# Patient Record
Sex: FEMALE | Race: WHITE | URBAN - NONMETROPOLITAN AREA
[De-identification: names, ages, dates, MRNs, and addresses within clinical notes are randomized per-mention and may not be internally consistent; named-entity substitution may affect disease eponyms.]

---

## 2017-04-17 ENCOUNTER — COMMUNICATION - GICH (OUTPATIENT)
Dept: GERIATRICS | Facility: OTHER | Age: 82
End: 2017-04-17

## 2017-04-17 DIAGNOSIS — N39.41 URGE INCONTINENCE: ICD-10-CM

## 2018-01-04 NOTE — TELEPHONE ENCOUNTER
Patient Information     Patient Name MRN Erica Dominguez [id number] Female 1932      Telephone Encounter by Eliza Guerra RN at 2017  4:47 PM     Author:  Eliza Guerra RN Service:  (none) Author Type:  (none)     Filed:  2017  4:48 PM Encounter Date:  2017 Status:  Signed     :  Eliza Guerra RN (NURS- Registered Nurse)            Office visit in the past 12 months or per provider note.    Last visit with URSULA BRANNON was on: 2015 in [facility]  Next visit with URSULA BRANNON is on: No future appointment listed with this provider  Next visit with Nursing Home is on: No future appointment listed in this department    Max refill for 12 months from last office visit or per provider note.    Medication refused - patient no longer sees Ursula Brannon NP for primary care.    Unable to complete prescription refill per RN Medication Refill Policy.................... Eliza Guerra ....................  2017   4:48 PM

## 2018-01-19 PROBLEM — K21.9 ESOPHAGEAL REFLUX: Status: ACTIVE | Noted: 2018-01-19

## 2018-01-19 PROBLEM — E03.9 HYPOTHYROIDISM: Status: ACTIVE | Noted: 2018-01-19

## 2018-01-19 PROBLEM — I10 HYPERTENSION: Status: ACTIVE | Noted: 2018-01-19

## 2018-01-19 PROBLEM — M79.609 PAIN IN LIMB: Status: ACTIVE | Noted: 2018-01-19

## 2018-01-19 PROBLEM — C50.919 BREAST CANCER, FEMALE (H): Status: ACTIVE | Noted: 2018-01-19

## 2018-01-19 RX ORDER — SIMVASTATIN 40 MG
40 TABLET ORAL
COMMUNITY
Start: 2015-09-21

## 2018-01-19 RX ORDER — HYDROCHLOROTHIAZIDE 25 MG/1
25 TABLET ORAL
COMMUNITY
Start: 2015-09-21

## 2018-01-19 RX ORDER — TRAMADOL HYDROCHLORIDE 50 MG/1
TABLET ORAL
COMMUNITY
Start: 2013-10-01

## 2018-01-19 RX ORDER — LEVOTHYROXINE SODIUM 150 UG/1
150 TABLET ORAL
COMMUNITY
Start: 2015-09-21

## 2018-01-19 RX ORDER — DIPHENOXYLATE HYDROCHLORIDE AND ATROPINE SULFATE 2.5; .025 MG/1; MG/1
1 TABLET ORAL
COMMUNITY
Start: 2013-05-01

## 2018-01-19 RX ORDER — OXYBUTYNIN CHLORIDE 5 MG/1
5 TABLET ORAL
COMMUNITY
Start: 2015-09-21

## 2018-02-12 ENCOUNTER — DOCUMENTATION ONLY (OUTPATIENT)
Dept: FAMILY MEDICINE | Facility: OTHER | Age: 83
End: 2018-02-12

## 2018-06-12 ENCOUNTER — TRANSFERRED RECORDS (OUTPATIENT)
Dept: HEALTH INFORMATION MANAGEMENT | Facility: OTHER | Age: 83
End: 2018-06-12

## 2018-06-19 ENCOUNTER — TRANSFERRED RECORDS (OUTPATIENT)
Dept: HEALTH INFORMATION MANAGEMENT | Facility: OTHER | Age: 83
End: 2018-06-19

## 2018-06-21 ENCOUNTER — TRANSFERRED RECORDS (OUTPATIENT)
Dept: HEALTH INFORMATION MANAGEMENT | Facility: OTHER | Age: 83
End: 2018-06-21

## 2018-06-22 ENCOUNTER — TRANSFERRED RECORDS (OUTPATIENT)
Dept: HEALTH INFORMATION MANAGEMENT | Facility: OTHER | Age: 83
End: 2018-06-22

## 2018-06-27 ENCOUNTER — TRANSFERRED RECORDS (OUTPATIENT)
Dept: HEALTH INFORMATION MANAGEMENT | Facility: OTHER | Age: 83
End: 2018-06-27

## 2018-06-27 ENCOUNTER — MEDICAL CORRESPONDENCE (OUTPATIENT)
Dept: HEALTH INFORMATION MANAGEMENT | Facility: OTHER | Age: 83
End: 2018-06-27

## 2018-06-27 ENCOUNTER — HOSPITAL ENCOUNTER (OUTPATIENT)
Dept: ULTRASOUND IMAGING | Facility: OTHER | Age: 83
Discharge: HOME | End: 2018-06-27
Attending: FAMILY MEDICINE | Admitting: FAMILY MEDICINE
Payer: MEDICARE

## 2018-06-27 VITALS
TEMPERATURE: 97.4 F | SYSTOLIC BLOOD PRESSURE: 115 MMHG | RESPIRATION RATE: 16 BRPM | HEART RATE: 71 BPM | OXYGEN SATURATION: 98 % | DIASTOLIC BLOOD PRESSURE: 90 MMHG

## 2018-06-27 DIAGNOSIS — C79.9 METASTATIC CANCER (H): ICD-10-CM

## 2018-06-27 DIAGNOSIS — R59.9 ENLARGED LYMPH NODE: ICD-10-CM

## 2018-06-27 PROCEDURE — 10022 US BIOPSY FINE NEEDLE ASPIRATION LYMPH NODE: CPT

## 2018-06-27 PROCEDURE — 25000125 ZZHC RX 250: Performed by: RADIOLOGY

## 2018-06-27 PROCEDURE — 88341 IMHCHEM/IMCYTCHM EA ADD ANTB: CPT | Mod: XU

## 2018-06-27 PROCEDURE — 88360 TUMOR IMMUNOHISTOCHEM/MANUAL: CPT

## 2018-06-27 PROCEDURE — 88342 IMHCHEM/IMCYTCHM 1ST ANTB: CPT | Mod: XU

## 2018-06-27 PROCEDURE — 88173 CYTOPATH EVAL FNA REPORT: CPT

## 2018-06-27 PROCEDURE — 88305 TISSUE EXAM BY PATHOLOGIST: CPT

## 2018-06-27 PROCEDURE — 81210 BRAF GENE: CPT | Mod: GZ

## 2018-06-27 PROCEDURE — 81235 EGFR GENE COM VARIANTS: CPT

## 2018-06-27 RX ADMIN — LIDOCAINE HYDROCHLORIDE 5 ML: 10 INJECTION, SOLUTION INFILTRATION; PERINEURAL at 11:47

## 2018-06-27 NOTE — IP AVS SNAPSHOT
MRN:[id number]                      After Visit Summary   6/27/2018    Erica Rain    MRN: [id number]           Visit Information        Provider Department      6/27/2018 11:15 AM [facility];  IMAGING NURSE; [facility]           Review of your medicines      UNREVIEWED medicines. Ask your doctor about these medicines        Dose / Directions    aspirin 81 MG tablet        Dose:  81 mg   Take 81 mg by mouth Take 1 tablet by mouth once daily with a meal.   Refills:  0       hydrochlorothiazide 25 MG tablet   Commonly known as:  HYDRODIURIL        Dose:  25 mg   Take 25 mg by mouth Take 1 tablet by mouth once daily.   Refills:  0       levothyroxine 150 MCG tablet   Commonly known as:  SYNTHROID/LEVOTHROID        Dose:  150 mcg   Take 150 mcg by mouth Take 1 tablet by mouth before breakfast.   Refills:  0       MULTI-VITAMINS Tabs        Dose:  1 tablet   Take 1 tablet by mouth Take 1 tablet by mouth once daily.   Refills:  0       omeprazole 20 MG CR capsule   Commonly known as:  priLOSEC        Dose:  20 mg   Take 20 mg by mouth Take 1 capsule by mouth once daily before a meal.   Refills:  0       oxybutynin 5 MG tablet   Commonly known as:  DITROPAN        Dose:  5 mg   Take 5 mg by mouth Take 1 tablet by mouth 2 times daily.   Refills:  0       simvastatin 40 MG tablet   Commonly known as:  ZOCOR        Dose:  40 mg   Take 40 mg by mouth Take 1 tablet by mouth at bedtime.   Refills:  0       traMADol 50 MG tablet   Commonly known as:  ULTRAM        Use 1 tablet up to two times daily prn for arthritic pain.   Refills:  0                Protect others around you: Learn how to safely use, store and throw away your medicines at www.disposemymeds.org.         Follow-ups after your visit         Care Instructions        Further instructions from your care team       ULTRASOUND GUIDED NEEDLE ASPIRATION    Ultrasound guided needle aspiration is a procedure which involves  the insertion of a small needle to withdraw amount of tissue that will be examined by a pathologist.  Your doctor will notify you of the results of your procedure, usually within a few days.  Because this procedure requires the insertion of a needle into your tissue, there is a small risk of bleeding, local tenderness and rarely infection.    ACTIVITY:  Most patients can return to work the day of the procedure, however, avoid any vigorous physical activity for 24 hours.    COMFORT:  If you experience discomfort or tenderness at the site, you may take your usual pain medication, prescription or over the counter.    DIET:  You may resume your normal diet.    CARE OF PROCEDURE SITE:  Occasionally, a patient may have a small amount of bleeding from the needle puncture site.  If this happens, you should lie down and apply gentle direct pressure to the bleeding site for about 10 minutes.  When the bleeding has stopped, apply another clean band-aid.    Keep the bandage on for 24 hours.  Then you may remove the bandage and shower.  You may put on a clean band-aid on or leave it open to air.  For questions, problems or concerns, contact the Radiology Department at [phone]     Additional Information About Your Visit        Care EveryWhere ID     This is your Care EveryWhere ID. This could be used by other organizations to access your [facility] medical records  [id number]        Your Vitals Were     Blood Pressure Pulse Temperature Respirations Pulse Oximetry       115/90 71 97.4  F (36.3  C) 16 98%        Primary Care Provider Office Phone # Fax #    Hermelinda HURTADO YOMI Brannon [phone]      Equal Access to Services     [facility] : Hadii edmundo [place], waaxda lujohnadaha, qaybta kaalmada carey, reinaldo fischer. So [facility] [phone].    ATENCIÓN: Si habla español, tiene a gomez disposición servicios gratuitos de asistencia lingüística. Llame al [phone].    We comply  with applicable federal civil rights laws and [place] laws. We do not discriminate on the basis of race, color, national origin, age, disability, sex, sexual orientation, or gender identity.            Thank you!     Thank you for choosing [facility] for your care. Our goal is always to provide you with excellent care. Hearing back from our patients is one way we can continue to improve our services. Please take a few minutes to complete the written survey that you may receive in the mail after you visit with us. Thank you!             Medication List: This is a list of all your medications and when to take them. Check marks below indicate your daily home schedule. Keep this list as a reference.      Medications           Morning Afternoon Evening Bedtime As Needed    aspirin 81 MG tablet   Take 81 mg by mouth Take 1 tablet by mouth once daily with a meal.                                hydrochlorothiazide 25 MG tablet   Commonly known as:  HYDRODIURIL   Take 25 mg by mouth Take 1 tablet by mouth once daily.                                levothyroxine 150 MCG tablet   Commonly known as:  SYNTHROID/LEVOTHROID   Take 150 mcg by mouth Take 1 tablet by mouth before breakfast.                                MULTI-VITAMINS Tabs   Take 1 tablet by mouth Take 1 tablet by mouth once daily.                                omeprazole 20 MG CR capsule   Commonly known as:  priLOSEC   Take 20 mg by mouth Take 1 capsule by mouth once daily before a meal.                                oxybutynin 5 MG tablet   Commonly known as:  DITROPAN   Take 5 mg by mouth Take 1 tablet by mouth 2 times daily.                                simvastatin 40 MG tablet   Commonly known as:  ZOCOR   Take 40 mg by mouth Take 1 tablet by mouth at bedtime.                                traMADol 50 MG tablet   Commonly known as:  ULTRAM   Use 1 tablet up to two times daily prn for arthritic pain.

## 2018-06-27 NOTE — PROGRESS NOTES
Biopsy of L axillary lymph node done with cytology staff present.  Pt serenity procedure well. Pressure held on biopsy site for 5 minutes. Sterile 2x2 placed over insertion site and covered with a sterile tegaderm.

## 2018-06-27 NOTE — IP AVS SNAPSHOT
[facility]    [address]    [place] [address]    Phone:  [phone]    Fax:  [phone]                                       After Visit Summary   6/27/2018    Erica Rain    MRN: [id number]           After Visit Summary Signature Page     I have received my discharge instructions, and my questions have been answered. I have discussed any challenges I see with this plan with the nurse or doctor.    ..........................................................................................................................................  Patient/Patient Representative Signature      ..........................................................................................................................................  Patient Representative Print Name and Relationship to Patient    ..................................................               ................................................  Date                                            Time    ..........................................................................................................................................  Reviewed by Signature/Title    ...................................................              ..............................................  Date                                                            Time

## 2018-06-27 NOTE — DISCHARGE INSTRUCTIONS
ULTRASOUND GUIDED NEEDLE ASPIRATION    Ultrasound guided needle aspiration is a procedure which involves the insertion of a small needle to withdraw amount of tissue that will be examined by a pathologist.  Your doctor will notify you of the results of your procedure, usually within a few days.  Because this procedure requires the insertion of a needle into your tissue, there is a small risk of bleeding, local tenderness and rarely infection.    ACTIVITY:  Most patients can return to work the day of the procedure, however, avoid any vigorous physical activity for 24 hours.    COMFORT:  If you experience discomfort or tenderness at the site, you may take your usual pain medication, prescription or over the counter.    DIET:  You may resume your normal diet.    CARE OF PROCEDURE SITE:  Occasionally, a patient may have a small amount of bleeding from the needle puncture site.  If this happens, you should lie down and apply gentle direct pressure to the bleeding site for about 10 minutes.  When the bleeding has stopped, apply another clean band-aid.    Keep the bandage on for 24 hours.  Then you may remove the bandage and shower.  You may put on a clean band-aid on or leave it open to air.  For questions, problems or concerns, contact the Radiology Department at [phone]

## 2018-06-29 ENCOUNTER — ONCOLOGY VISIT (OUTPATIENT)
Dept: RADIATION ONCOLOGY | Facility: HOSPITAL | Age: 83
End: 2018-06-29
Attending: RADIOLOGY
Payer: MEDICARE

## 2018-06-29 VITALS
RESPIRATION RATE: 16 BRPM | WEIGHT: 187 LBS | SYSTOLIC BLOOD PRESSURE: 116 MMHG | BODY MASS INDEX: 33.13 KG/M2 | HEART RATE: 72 BPM | HEIGHT: 63 IN | DIASTOLIC BLOOD PRESSURE: 64 MMHG

## 2018-06-29 DIAGNOSIS — C79.31 BRAIN METASTASIS: Primary | ICD-10-CM

## 2018-06-29 PROCEDURE — G0463 HOSPITAL OUTPT CLINIC VISIT: HCPCS | Performed by: RADIOLOGY

## 2018-06-29 RX ORDER — METOPROLOL TARTRATE 25 MG/1
25 TABLET, FILM COATED ORAL 2 TIMES DAILY
COMMUNITY
Start: 2018-06-23

## 2018-06-29 RX ORDER — AMLODIPINE BESYLATE 2.5 MG/1
2.5 TABLET ORAL DAILY
Refills: 3 | COMMUNITY
Start: 2018-05-30

## 2018-06-29 RX ORDER — DEXAMETHASONE 4 MG/1
4 TABLET ORAL 2 TIMES DAILY
COMMUNITY
Start: 2018-06-23

## 2018-06-29 ASSESSMENT — PAIN SCALES - GENERAL: PAINLEVEL: MODERATE PAIN (5)

## 2018-06-29 NOTE — MR AVS SNAPSHOT
"              After Visit Summary   6/29/2018    Erica Rain    MRN: [id number]           Patient Information     Date Of Birth          4/28/1932        Visit Information        Provider Department      6/29/2018 9:00 AM Abraham Acevedo MD [facility]        Today's Diagnoses     Brain metastasis (H)    -  1       Follow-ups after your visit        Your next 10 appointments already scheduled     Jul 27, 2018 10:00 AM CDT   New Visit with David Ramos MD   [facility] ([facility])    [address]  [place] [id number] [phone]              Who to contact     If you have questions or need follow up information about today's clinic visit or your schedule please contact [facility] directly at [phone].  Normal or non-critical lab and imaging results will be communicated to you by MyChart, letter or phone within 4 business days after the clinic has received the results. If you do not hear from us within 7 days, please contact the clinic through MyChart or phone. If you have a critical or abnormal lab result, we will notify you by phone as soon as possible.  Submit refill requests through [employer] or call your pharmacy and they will forward the refill request to us. Please allow 3 business days for your refill to be completed.          Additional Information About Your Visit        Care EveryWhere ID     This is your Care EveryWhere ID. This could be used by other organizations to access your [place] medical records  [id number]        Your Vitals Were     Pulse Respirations Height Breastfeeding? BMI (Body Mass Index)       72 16 1.6 m (5' 3\") No 33.13 kg/m2        Blood Pressure from Last 3 Encounters:   06/29/18 116/64   06/27/18 115/90   09/21/15 112/82    Weight from Last 3 Encounters:   06/29/18 84.8 kg (187 lb)   09/21/15 89.5 kg (197 lb 4 oz)   09/04/14 90.8 kg (200 lb 4 oz)              We Performed the Following "     Full Code        Primary Care Provider Office Phone # Fax #    Hermelinda Brannon, APRN [phone] [address]  [place] [address]        Equal Access to Services     YOLA JOHNSON : Hadii edmundo ku hadfransiscoo Soomaali, waaxda luqadaha, qaybta kaalmada adeegyada, waxmak lopesn dafne chávez ava fischer. So [facility] [phone].    ATENCIÓN: Si habla español, tiene a gomez disposición servicios gratuitos de asistencia lingüística. Llame al [phone].    We comply with applicable federal civil rights laws and [place] laws. We do not discriminate on the basis of race, color, national origin, age, disability, sex, sexual orientation, or gender identity.            Thank you!     Thank you for choosing [facility]  for your care. Our goal is always to provide you with excellent care. Hearing back from our patients is one way we can continue to improve our services. Please take a few minutes to complete the written survey that you may receive in the mail after your visit with us. Thank you!             Your Updated Medication List - Protect others around you: Learn how to safely use, store and throw away your medicines at www.disposemymeds.org.          This list is accurate as of 6/29/18  1:39 PM.  Always use your most recent med list.                   Brand Name Dispense Instructions for use Diagnosis    amLODIPine 2.5 MG tablet    NORVASC     Take 2.5 mg by mouth daily        amoxicillin-clavulanate 875-125 MG per tablet    AUGMENTIN     Take 1 tablet by mouth 2 times daily        aspirin 81 MG tablet      Take 81 mg by mouth Take 1 tablet by mouth once daily with a meal.        dexamethasone 4 MG tablet    DECADRON     Take 4 mg by mouth 2 times daily        hydrochlorothiazide 25 MG tablet    HYDRODIURIL     Take 25 mg by mouth Take 1 tablet by mouth once daily.        levothyroxine 150 MCG tablet    SYNTHROID/LEVOTHROID     Take 150 mcg by mouth Take 1 tablet by mouth before  breakfast.        metoprolol tartrate 25 MG tablet    LOPRESSOR     Take 25 mg by mouth 2 times daily        MULTI-VITAMINS Tabs      Take 1 tablet by mouth Take 1 tablet by mouth once daily.        omeprazole 20 MG CR capsule    priLOSEC     Take 20 mg by mouth Take 1 capsule by mouth once daily before a meal.        oxybutynin 5 MG tablet    DITROPAN     Take 5 mg by mouth Take 1 tablet by mouth 2 times daily.        simvastatin 40 MG tablet    ZOCOR     Take 40 mg by mouth Take 1 tablet by mouth at bedtime.        traMADol 50 MG tablet    ULTRAM     Use 1 tablet up to two times daily prn for arthritic pain.

## 2018-06-29 NOTE — PROGRESS NOTES
"INITIAL PATIENT ASSESSMENT    Referring Physician: Maude Maier NP  Other Physicians: NA    Diagnosis: brain metastases    /64 (BP Location: Left arm, Patient Position: Chair, Cuff Size: Adult Regular)  Pulse 72  Resp 16  Ht 1.6 m (5' 3\")  Wt 84.8 kg (187 lb)  Breastfeeding? No  BMI 33.13 kg/m2    Prior radiation therapy:   Site Treated: right breast  Facility: [place]  Dates:   Dose: uknown    Prior chemotherapy: None    Prior hormonal therapy:Yes: HRT -unsure for how long.     Pain Eval:  Current history of pain associated with this visit:   Intensity: 5/10  Current: arthritis pain, hands feel swollen  Location: hands bilateral  Treatment: pain tolerable without tx    Psychosocial  Marital Status:    Spouse/Significant other: NA   Children: 2   Occupation:  Clerical work at     Retired: Yes  Living arrangements: family taking turns staying with her  Do you feel safe at home? Yes  Activity status: ambulates with assistive device   referral needs: Not needed    Advanced Directive: Yes - Location: home.  Instructed her to bring in a copy.     Breast Exam: unknown  Mammogram: unknown  Last Pap: unknown  : 2  Para: 2  Onset of menarche: 13  LMP: No LMP recorded. Patient is postmenopausal.  Onset of menopause: late 40s  Abnormal vaginal bleeding/discharge: No  Are you pregnant? No  Reproductive note: NA  Patient was assessed using the NCCN psychosocial distress thermometer. Patient rated the score as a 5. Patient rated current stressors as \"worry and shock\". Stressors will be brought to the attention of provider or Oncology RN Care Coordinator for a score of 6 or greater or per nurses discretion.     Pt is here today for a consult for radiation therapy for brain metastases.  Educated patient on the mapping process and the possible side effects of XRT to the head, to include: hair loss, skin irritaition, and fatigue.  Pt verbalizes an understanding and has no " questions at this time. Pt is accompanied by sister today.      Ashlee Moncada RN

## 2018-07-02 NOTE — CONSULTS
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      HISTORY OF PRESENT ILLNESS:  The patient has suffered apparently rather marked progression over the past few months in  performance status due to confusion and significant progression of unsteadiness of her gait.  Her sister describes her as quite viable and functional when she left for [place] in October of last year and the patient states that her problems began about 6 months ago when she became very tired, presented to the healthcare system near [place], [place] with ankle swelling.  She believes she had some kidney issues and describes being treated with fluid restriction and somehow getting better.  I not sure if she is just talking about her mentation. 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  At any rate, it sounds like [place] a while back and she says that a neighbor suggested she seek further healthcare.        She went to [facility], I believe had a CT scan, EKG and she states reacted to some sort of medication or perhaps contrast material.  She did, however, on CT scan have a superior segment right lower lobe tumor with significant mediastinal adenopathy.  She has a history of breast carcinoma with some abnormality of the right breast on mammography right now.  She did receive radiation therapy in a breast preservation setting for that in 2005 when she was in [place].  At any rate, by CT she had a significantly enlarged left axillary lymph node of which an FNA has been obtained.  They have not received results of that lymph node aspiration as of yet.      PAST MEDICAL HISTORY:  Appendectomy, cataract surgery, vitrectomy, bilateral total knee arthroplasties and right breast carcinoma treated with breast preserving treatment.      INTERCURRENT ILLNESS:   Iodine hypothyroidism, hypertension, hypercholesterolemia, gastroesophageal reflux disease, atrial fibrillation, TMJ problems, degenerative cervical disk disease, urinary incontinence.      ALLERGIES:  TIZANIDINE, LATEX AND POLLEN.      REPRODUCTIVE HISTORY:   2, para 2.  Menarche at 13.  Menopause late 40s.      REVIEW OF SYSTEMS:  No diplopia, headaches, dizziness, loss of consciousness.  No swallowing difficulties, odynophagia or dysphagia.  No thermal or temperature intolerance.  No nausea, vomiting, heartburn.  No abdominal complaints, constipation, bloating, diarrhea.  She is severely tired.  Diet is regular.  Appetite poor, weight down 10 pounds since December.      FAMILY HISTORY:  Noncontributory.      HABITS:  Alcohol use:  1-2 drinks day until recently.  Not drinking at all now.  Tobacco:  15-year history, quit many, many years ago.      SOCIAL AND DEMOGRAPHIC:  The patient is , has 2 adult offspring.  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      PHYSICAL EXAMINATION:   GENERAL:  Reveals a pleasant female with marked confusion, although she does a remarkable job of relating her history.  She moves with much hesitation very cautiously, but is able to ambulate to the exam table with minimal assistance.   VITAL SIGNS:  Weight is 187.9 pounds, blood pressure 116/64, heart rate 72.   HEENT:  Extraocular movements, full.  Pupils are equal, round, reactive.  Face symmetric.  Palate and tongue, midline and symmetric.   NECK:  No cervical or supraclavicular lymphadenopathy.   LUNGS:  Clear to auscultation.  Somewhat diminished breath sounds inferiorly in both lungs.   CARDIAC:  Exam reveals regular rate and rhythm without murmurs or extra sounds.   LYMPHATIC:  No palpable axillary adenopathy, although the left axilla has been biopsied under ultrasound guidance.   ABDOMEN:  Nontender without  obvious organomegaly.  No inguinal lymphadenopathy.   EXTREMITIES:  Extremity strength is intact and symmetric.      RADIOGRAPHIC FINDINGS:  As summarized above.  Additionally, a brain MRI shows multiple scattered areas consistent with metastatic disease both supratentorially and infratentorially.        I did obtain the results of the left axillary FNA which were available to me at the time of consultation.  This did reveal a non-small cell malignancy consistent with lung carcinoma which of course is very consistent with the clinical findings.      IMPRESSION:  Marked metastatic disease to the brain in the setting of a recently diagnosed non-small cell lung carcinoma.      I discussed this quite extensively with the patient and her sister.  While she does describe a history of what sounds like SIADH, her electrolytes look fine now so perhaps her fluid and confusion issues at that time were directly related to her disease and other underlying processes.  We discussed potential palliative radiation therapy.  The patient and her sister seemed quite accepting of the inevitable.  She desires no treatment except possibly comfort measures, which I think again is very reasonable.  She has actually made plans in terms of where she will live.  She apparently has a condominium in the [facility] [place] [facility] area which she would move to at this time and believes she can have adequate comfort care there with home health.  All in all, a very satisfying discussion with two delightful women, very, very realistic and cognizant of the situation.        She, I believe, will cancel medical oncology appointment in the near future which is scheduled for .  I will tell Dr. Ramos that she will not be making this appointment.            ALDA BASURTO MD             D: 2018   T: 2018   MT: ROM      Name:     KULDEEP ADAMSON   MRN:      [id number]        Account:       [id number]   :      1932           Consult Date:   06/29/2018      Document: [id number]       cc: Maude Maier CNP

## 2018-07-05 DIAGNOSIS — C50.919 BREAST CANCER (H): Primary | ICD-10-CM

## 2018-07-05 DIAGNOSIS — C79.31 BRAIN METASTASIS: ICD-10-CM

## 2023-04-03 PROBLEM — C79.31 MALIGNANT NEOPLASM METASTATIC TO BRAIN (H): Status: ACTIVE | Noted: 2018-06-29

## (undated) RX ORDER — LIDOCAINE HYDROCHLORIDE 10 MG/ML
INJECTION, SOLUTION INFILTRATION; PERINEURAL
Status: DISPENSED
Start: 2018-06-27